# Patient Record
Sex: MALE | Employment: FULL TIME | URBAN - METROPOLITAN AREA
[De-identification: names, ages, dates, MRNs, and addresses within clinical notes are randomized per-mention and may not be internally consistent; named-entity substitution may affect disease eponyms.]

---

## 2017-02-08 ENCOUNTER — HOSPITAL ENCOUNTER (EMERGENCY)
Age: 37
Discharge: HOME | End: 2017-02-08
Attending: FAMILY MEDICINE

## 2017-02-08 VITALS
WEIGHT: 176 LBS | TEMPERATURE: 97.7 F | HEIGHT: 70 IN | RESPIRATION RATE: 18 BRPM | DIASTOLIC BLOOD PRESSURE: 84 MMHG | HEART RATE: 68 BPM | BODY MASS INDEX: 25.2 KG/M2 | SYSTOLIC BLOOD PRESSURE: 137 MMHG | OXYGEN SATURATION: 97 %

## 2017-02-08 DIAGNOSIS — F41.1 ANXIETY STATE: Primary | ICD-10-CM

## 2017-02-08 RX ORDER — HYDROXYZINE 50 MG/1
50 TABLET, FILM COATED ORAL
Qty: 20 TAB | Refills: 0 | Status: SHIPPED | OUTPATIENT
Start: 2017-02-08 | End: 2017-02-18

## 2017-02-08 NOTE — DISCHARGE INSTRUCTIONS

## 2017-02-08 NOTE — UC PROVIDER NOTE
HPI Comments: 30 yo male presents today with C/O increased stress at home and work, denies SI/SA. Reports intermittent generalized abdominal pain for several months. Denies vomiting or fever. Denies blood in stool. Patient is a 31 y.o. male presenting with abdominal pain. The history is provided by the patient. No  was used. Abdominal Pain    This is a recurrent problem. The current episode started more than 1 week ago. Episode frequency: Intermittent. The problem has not changed since onset. The pain is associated with diet changes. The pain is located in the generalized abdominal region. The quality of the pain is burning. The pain is at a severity of 0/10. The patient is experiencing no pain. Pertinent negatives include no fever, no diarrhea, no nausea, no vomiting, no chest pain, no testicular pain and no back pain. Nothing worsens the pain. The pain is relieved by nothing. Past Medical History   Diagnosis Date    Anxiety         Past Surgical History   Procedure Laterality Date    Hc foot/toes surgery procedure           History reviewed. No pertinent family history. Social History     Social History    Marital status:      Spouse name: N/A    Number of children: N/A    Years of education: N/A     Occupational History    Not on file. Social History Main Topics    Smoking status: Current Every Day Smoker    Smokeless tobacco: Never Used    Alcohol use Yes    Drug use: Yes     Special: Marijuana    Sexual activity: Yes     Partners: Female     Other Topics Concern    Not on file     Social History Narrative                ALLERGIES: Review of patient's allergies indicates no known allergies. Review of Systems   Constitutional: Negative. Negative for fever. HENT: Negative. Respiratory: Negative. Cardiovascular: Negative. Negative for chest pain. Gastrointestinal: Positive for abdominal pain. Negative for diarrhea, nausea and vomiting. Endocrine: Negative. Genitourinary: Negative. Negative for testicular pain. Musculoskeletal: Negative for back pain. Neurological: Negative. Hematological: Negative. Psychiatric/Behavioral:        Increased stress and anxiety       Vitals:    02/08/17 1017   BP: 137/84   Pulse: 68   Resp: 18   Temp: 97.7 °F (36.5 °C)   SpO2: 97%   Weight: 79.8 kg (176 lb)   Height: 5' 10\" (1.778 m)       Physical Exam   Constitutional: He is oriented to person, place, and time. He appears well-developed and well-nourished. HENT:   Head: Normocephalic and atraumatic. Right Ear: External ear normal.   Left Ear: External ear normal.   Nose: Nose normal.   Mouth/Throat: Oropharynx is clear and moist.   Eyes: Conjunctivae and EOM are normal. Pupils are equal, round, and reactive to light. Neck: Normal range of motion. Neck supple. Cardiovascular: Normal rate, regular rhythm and normal heart sounds. Pulmonary/Chest: Effort normal and breath sounds normal.   Abdominal: Soft. Bowel sounds are normal. He exhibits no distension. There is no tenderness. There is no rebound. Musculoskeletal: Normal range of motion. Neurological: He is alert and oriented to person, place, and time. Skin: Skin is warm and dry. Psychiatric: He has a normal mood and affect. His behavior is normal. Judgment and thought content normal.   Nursing note and vitals reviewed. MDM     Differential Diagnosis; Clinical Impression; Plan:     CLINICAL IMPRESSION:  Anxiety state  (primary encounter diagnosis)    Plan:  1. Try to de-stress as much as you can  2. Try Hydroxyzine at night  3. You  Will need to follow up with a PCP   Risk of Significant Complications, Morbidity, and/or Mortality:   Presenting problems: Moderate  Diagnostic procedures:   Moderate  Progress:   Patient progress:  Stable      Procedures